# Patient Record
Sex: FEMALE | Race: WHITE | URBAN - METROPOLITAN AREA
[De-identification: names, ages, dates, MRNs, and addresses within clinical notes are randomized per-mention and may not be internally consistent; named-entity substitution may affect disease eponyms.]

---

## 2017-08-15 ENCOUNTER — IMPORTED ENCOUNTER (OUTPATIENT)
Dept: URBAN - METROPOLITAN AREA CLINIC 9 | Facility: CLINIC | Age: 72
End: 2017-08-15

## 2017-09-07 ENCOUNTER — IMPORTED ENCOUNTER (OUTPATIENT)
Dept: URBAN - METROPOLITAN AREA CLINIC 9 | Facility: CLINIC | Age: 72
End: 2017-09-07

## 2017-10-18 ENCOUNTER — IMPORTED ENCOUNTER (OUTPATIENT)
Dept: URBAN - METROPOLITAN AREA CLINIC 9 | Facility: CLINIC | Age: 72
End: 2017-10-18

## 2017-11-02 NOTE — PATIENT DISCUSSION
(B53.994) Keratoconjunct sicca, not specified as Sjogren's, bilateral - Assesment : Examination revealed Dry Eye Syndrome IRREGULAR TEAR FILM.  OU: INFERIOR PE - Plan : ATS OU TID

## 2017-11-02 NOTE — PATIENT DISCUSSION
(H40.013) Open angle with borderline findings, low risk, bilateral - Assesment : Examination revealed suspicion for Open Angle Glaucoma. - LOW RISK  IOP WITHIN NORMAL LIMITS TODAY. C/D ASYMMETRY - Plan : Monitor for changes. Advised patient to call our office when decreased vision or increased eye pain.  1 YEAR EXAM/ ON OCT

## 2018-11-02 NOTE — PATIENT DISCUSSION
(H25.13) Age-related nuclear cataract, bilateral - Assesment : Examination revealed cataract.- MILD - Plan : Monitor for changes. Advised patient to call our office with decreased vision or increased symptoms.

## 2018-11-02 NOTE — PATIENT DISCUSSION
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 CALL WITH INCREASED FLASHES, FLOATERS OR DECREASED VISION.

## 2018-11-02 NOTE — PATIENT DISCUSSION
(H59.169) Vitreous degeneration, bilateral - Assesment : Examination revealed PVD - Plan : Monitor for changes. Advised patient to call our office with decreased vision or an increase in flashes and/or floaters.

## 2018-12-06 ENCOUNTER — IMPORTED ENCOUNTER (OUTPATIENT)
Dept: URBAN - METROPOLITAN AREA CLINIC 9 | Facility: CLINIC | Age: 73
End: 2018-12-06

## 2019-02-14 NOTE — PATIENT DISCUSSION
(B02.9) Zoster without complications - Assesment : SHINGLES ON RIDE SIDE OF FACE V2 DISTRIBUTION. SYMPTOMS STARTED OVER THE WEEKEND. SHE IS ON MEDICATIONS TO TREAT THE SHINGLES. NO APD OU. DILATE OU AT 9:44. ADVISED PATIENT THAT SOMETIMES THE SHINGLES CAN GET INTO THE EYE. AFTER DILATION THERE IS NO INFLAMMATION PRESENT IN THE EYE.   RECOMMEND PATIENT TO CALL IF ANY DECREASE IN VISION, - Plan : PT TO CALL WITH THE NAME OF THE CREAM SHE IS USING FOR THE SHINGLES  KEEP 11/2019 APPOINTMENT

## 2020-06-23 ENCOUNTER — IMPORTED ENCOUNTER (OUTPATIENT)
Dept: URBAN - METROPOLITAN AREA CLINIC 9 | Facility: CLINIC | Age: 75
End: 2020-06-23

## 2021-07-27 ENCOUNTER — IMPORTED ENCOUNTER (OUTPATIENT)
Dept: URBAN - METROPOLITAN AREA CLINIC 9 | Facility: CLINIC | Age: 76
End: 2021-07-27

## 2021-10-18 ASSESSMENT — VISUAL ACUITY
OS_PH: 20/30 SN
OD_SC: 20/50 - SN
OS_SC: 20/100 + SN
OD_SC: 20/30 SN
OD_PH: 20/25 SN
OS_CC: 20/25 - SN
OD_CC: 20/25 SN
OS_SC: CF 2FT SN
OS_SC: 20/80 SN
OD_CC: 20/30 SN
OD_CC: 20/20 - SN
OD_CC: 20/25 - SN
OS_CC: 20/25 SN
OS_CC: 20/80 - SN
OD_CC: 20/25 + SN
OS_CC: 20/25 -2 SN
OD_CC: 20/40 SN
OD_CC: 20/30 SN
OS_CC: 20/50 -2 SN
OD_CC: 20/20 SN
OS_CC: 20/40 SN
OD_SC: 20/400 SN
OS_SC: 20/50 SN
OS_CC: 20/30 - SN

## 2021-10-18 ASSESSMENT — KERATOMETRY
OD_AXISANGLE_DEGREES: 178
OS_AXISANGLE2_DEGREES: 81
OD_K2POWER_DIOPTERS: 43.5
OS_AXISANGLE_DEGREES: 165
OD_K2POWER_DIOPTERS: 43
OD_AXISANGLE_DEGREES: 1
OD_K1POWER_DIOPTERS: 42.25
OS_K1POWER_DIOPTERS: 41.25
OS_K2POWER_DIOPTERS: 43.75
OS_AXISANGLE2_DEGREES: 82
OD_K1POWER_DIOPTERS: 42.25
OS_K2POWER_DIOPTERS: 44
OS_AXISANGLE_DEGREES: 171
OD_AXISANGLE_DEGREES: 3
OS_K1POWER_DIOPTERS: 41.5
OD_AXISANGLE2_DEGREES: 93
OS_AXISANGLE2_DEGREES: 75
OD_AXISANGLE2_DEGREES: 91
OS_AXISANGLE_DEGREES: 172
OD_AXISANGLE2_DEGREES: 88
OS_K2POWER_DIOPTERS: 44
OD_K1POWER_DIOPTERS: 42.25
OS_K1POWER_DIOPTERS: 41.25
OD_K2POWER_DIOPTERS: 43

## 2021-10-18 ASSESSMENT — TONOMETRY
OS_IOP_MMHG: 11
OD_IOP_MMHG: 10
OS_IOP_MMHG: 14
OD_IOP_MMHG: 10
OD_IOP_MMHG: 13
OS_IOP_MMHG: 15
OS_IOP_MMHG: 11
OD_IOP_MMHG: 12
OD_IOP_MMHG: 17
OS_IOP_MMHG: 16
OS_IOP_MMHG: 12